# Patient Record
Sex: FEMALE | Race: ASIAN | NOT HISPANIC OR LATINO | Employment: STUDENT | URBAN - METROPOLITAN AREA
[De-identification: names, ages, dates, MRNs, and addresses within clinical notes are randomized per-mention and may not be internally consistent; named-entity substitution may affect disease eponyms.]

---
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
## 2023-07-31 ENCOUNTER — OFFICE VISIT (OUTPATIENT)
Dept: URGENT CARE | Facility: URGENT CARE | Age: 18
End: 2023-07-31
Payer: COMMERCIAL

## 2023-07-31 VITALS
WEIGHT: 132 LBS | BODY MASS INDEX: 23.77 KG/M2 | SYSTOLIC BLOOD PRESSURE: 102 MMHG | HEART RATE: 60 BPM | OXYGEN SATURATION: 96 % | TEMPERATURE: 97.5 F | DIASTOLIC BLOOD PRESSURE: 67 MMHG | RESPIRATION RATE: 16 BRPM

## 2023-07-31 DIAGNOSIS — J06.9 UPPER RESPIRATORY TRACT INFECTION, UNSPECIFIED TYPE: ICD-10-CM

## 2023-07-31 DIAGNOSIS — H10.33 ACUTE CONJUNCTIVITIS OF BOTH EYES, UNSPECIFIED ACUTE CONJUNCTIVITIS TYPE: Primary | ICD-10-CM

## 2023-07-31 PROCEDURE — 99213 OFFICE O/P EST LOW 20 MIN: CPT | Performed by: EMERGENCY MEDICINE

## 2023-07-31 RX ORDER — OFLOXACIN 3 MG/ML
1-2 SOLUTION/ DROPS OPHTHALMIC 4 TIMES DAILY
Qty: 5 ML | Refills: 0 | Status: SHIPPED | OUTPATIENT
Start: 2023-07-31

## 2023-07-31 NOTE — PROGRESS NOTES
Assessment & Plan     Diagnosis:    ICD-10-CM    1. Acute conjunctivitis of both eyes, unspecified acute conjunctivitis type  H10.33 ofloxacin (OCUFLOX) 0.3 % ophthalmic solution      2. Upper respiratory tract infection, unspecified type  J06.9           Medical Decision Making  Mary Abernathy is a 18 year old female who presents for evaluation of a red eye.  A broad differential diagnosis was considered including bacterial conjunctivitis, viral conjunctivitis, foreign body, corneal abrasion, chemical vs allergic conjunctivitis, corneal ulcer, HSV, herpes zoster ophthalmicus, orbital cellulitis, etc.  Signs and symptoms consistent with a conjunctivitis, likely bacterial. Will start antibiotics and have close follow-up of eye physician.  No red flag symptoms to suggest any of the above worrisome etiologies.      Patient is a contact lens wearer so will start a fluoroquinolone and give strict instructions for no contacts, solution use or case use till cleared by optho.      Patient voices understanding and agreement with the plan including reasons to go to the ER immediately as well as to be seen by a more consistent care-giver, such as their PCP, if the symptoms persist more than 3 days.     Erik Nogueira PA-C  [facility]    Subjective     Mary Abernathy is a 18 year old female who presents to clinic today for the following health issues:  Chief Complaint   Patient presents with    Cough    Nasal Congestion    Eye Drainage       HPI    Patient reports for the last 4 days she been experiencing sore throat which is improving, runny nose and this morning started having yellowish discharge from both her eyes.  She notes that it was crusted shut she had to wipe them away.  She has no vision changes, neck pain or stiffness, difficulty swallowing or breathing, cough, shortness of breath, wheezing, headaches or foreign body sensation in the eyes.    Review of Systems    See HPI    Objective      Vitals: BP  102/67   Pulse 60   Temp 97.5  F (36.4  C) (Tympanic)   Resp 16   Wt 59.9 kg (132 lb)   SpO2 96%   BMI 23.77 kg/m      Patient Vitals for the past 24 hrs:   BP Temp Temp src Pulse Resp SpO2 Weight   07/31/23 1031 102/67 97.5  F (36.4  C) Tympanic 60 16 96 % 59.9 kg (132 lb)       Vital signs reviewed by: Erik Nogueira PA-C    Physical Exam   Constitutional: Patient is alert. No acute distress.  Ears: Right TM is normal. Left TM is normal. External ear canals are normal.  Mouth: Mucous membranes are moist. Normal tongue and tonsil. Posterior oropharynx is clear.  Eyes: Conjunctivae is injected bilaterally; redness does not cross the limbus. There is mattering/discharge noted at the eyelid margins and at the medial canthi. EOMI are normal. PERRL.   Neurological: Alert. CN 2-7 intact.      Erik Nogueira PA-C, July 31, 2023

## 2023-08-04 ENCOUNTER — OFFICE VISIT (OUTPATIENT)
Dept: URGENT CARE | Facility: URGENT CARE | Age: 18
End: 2023-08-04
Payer: COMMERCIAL

## 2023-08-04 VITALS
HEART RATE: 66 BPM | WEIGHT: 129.6 LBS | BODY MASS INDEX: 23.34 KG/M2 | RESPIRATION RATE: 16 BRPM | SYSTOLIC BLOOD PRESSURE: 108 MMHG | DIASTOLIC BLOOD PRESSURE: 68 MMHG | OXYGEN SATURATION: 99 % | TEMPERATURE: 97.9 F

## 2023-08-04 DIAGNOSIS — R05.1 ACUTE COUGH: ICD-10-CM

## 2023-08-04 DIAGNOSIS — J06.9 VIRAL UPPER RESPIRATORY ILLNESS: Primary | ICD-10-CM

## 2023-08-04 PROCEDURE — 99213 OFFICE O/P EST LOW 20 MIN: CPT | Performed by: PHYSICIAN ASSISTANT

## 2023-08-04 RX ORDER — FLUTICASONE PROPIONATE 50 MCG
1 SPRAY, SUSPENSION (ML) NASAL DAILY
Qty: 16 G | Refills: 0 | Status: SHIPPED | OUTPATIENT
Start: 2023-08-04 | End: 2023-08-14

## 2023-08-04 RX ORDER — BENZONATATE 100 MG/1
100 CAPSULE ORAL 3 TIMES DAILY PRN
Qty: 30 CAPSULE | Refills: 0 | Status: SHIPPED | OUTPATIENT
Start: 2023-08-04 | End: 2023-08-14

## 2023-08-04 NOTE — PROGRESS NOTES
Chief Complaint   Patient presents with    Fever    Pharyngitis    Cough     Sx started on 07/27. Pt was seen on 07/31, given eye drops for pink eye.     Dizziness     Felt dizzy and almost passed out yesterday (Thursday) at work.     Nausea                 ASSESSMENT:     ICD-10-CM    1. Viral upper respiratory illness  J06.9 benzonatate (TESSALON) 100 MG capsule     fluticasone (FLONASE) 50 MCG/ACT nasal spray      2. Acute cough  R05.1 benzonatate (TESSALON) 100 MG capsule     fluticasone (FLONASE) 50 MCG/ACT nasal spray              PLAN: Vital signs stable.  Viral respiratory illness with acute cough.  Sore throat continues to improve.  Flonase nasal spray daily over the next 10 to 14 days.  Tessalon Perles as needed.  I have discussed clinical findings with patient.  Side effects of medications discussed.  Symptomatic care is discussed.  I have discussed the possibility of  worsening symptoms and indication to RTC or go to the ER if they occur.  All questions are answered, patient indicates understanding of these issues and is in agreement with plan.   Patient care instructions are discussed/given at the end of visit.   Lots of rest and fluids.      Ronit Cardenas PA-C      SUBJECTIVE:  18-year-old female presents for cough, sore throat, sinus congestion for 8 days.  Yesterday felt really dizzy because of it and felt faint.  No vomiting or diarrhea.  Some nausea.  Some postnasal drip.  Sore throat continues to improve.  Cough bothers her the most and keeps her up at night.  States it is a wet cough.  First day had a temp but this has resolved.  No history of asthma or seasonal allergies.      No Known Allergies    No past medical history on file.    ofloxacin (OCUFLOX) 0.3 % ophthalmic solution, Place 1-2 drops into both eyes 4 times daily For 5-7 days until it clear. No more than 7 days consecutive use.    No current facility-administered medications on file prior to visit.      Social History      Tobacco Use    Smoking status: Never    Smokeless tobacco: Never    Tobacco comments:     No expsoure   Substance Use Topics    Alcohol use: No       ROS:  CONSTITUTIONAL: Negative for fatigue or fever.  EYES: Negative for eye problems.  ENT: As above.  RESP: As above.  CV: Negative for chest pains.  GI: Negative for vomiting.  MUSCULOSKELETAL:  Negative for significant muscle or joint pains.  NEUROLOGIC: Negative for headaches.  SKIN: Negative for rash.  PSYCH: Normal mentation for age.    OBJECTIVE:  /68 (BP Location: Left arm, Patient Position: Sitting, Cuff Size: Adult Regular)   Pulse 66   Temp 97.9  F (36.6  C) (Tympanic)   Resp 16   Wt 58.8 kg (129 lb 9.6 oz)   SpO2 99%   BMI 23.34 kg/m    GENERAL APPEARANCE: Healthy, alert and no distress.  EYES:Conjunctiva/sclera clear.  EARS: No cerumen.   Ear canals w/o erythema.  TM's intact w/o erythema.    NOSE/MOUTH: Nose without ulcers, erythema or lesions.  SINUSES: No maxillary sinus tenderness.  THROAT: No erythema w/o tonsillar enlargement . No exudates.  NECK: Supple, nontender, no lymphadenopathy.  RESP: Lungs clear to auscultation - no rales, rhonchi or wheezes  CV: Regular rate and rhythm, normal S1 S2, no murmur noted.  NEURO: Awake, alert    SKIN: No rashes        Ronit Cardenas PA-C

## 2023-10-03 ENCOUNTER — TELEPHONE (OUTPATIENT)
Dept: URGENT CARE | Facility: URGENT CARE | Age: 18
End: 2023-10-03
Payer: COMMERCIAL

## 2023-10-03 NOTE — TELEPHONE ENCOUNTER
Pharmacy requesting refill on  med last filled in urgent care:     Disp Refills Start End RODERICK   fluticasone (FLONASE) 50 MCG/ACT nasal spray 16 g 0 2023

## 2023-10-03 NOTE — TELEPHONE ENCOUNTER
Per chart review, pt has not seen [facility] primary provider. Pt most recently saw primary care provider at [facility] in 7/2021. Flonase prescription sent by UC provider after visit on 8/4/23.     Pt would need to reestablish with primary care or go to UC if pt's symptoms are persisting and she is need refill.     RN LVM requesting call back from patient.    RN spoke with pharmacy staff who will cancel refill request.     Ann-Marie Manzano RN

## 2023-10-21 ENCOUNTER — HEALTH MAINTENANCE LETTER (OUTPATIENT)
Age: 18
End: 2023-10-21

## 2024-12-14 ENCOUNTER — HEALTH MAINTENANCE LETTER (OUTPATIENT)
Age: 19
End: 2024-12-14